# Patient Record
Sex: FEMALE | Race: BLACK OR AFRICAN AMERICAN | NOT HISPANIC OR LATINO | Employment: OTHER | URBAN - METROPOLITAN AREA
[De-identification: names, ages, dates, MRNs, and addresses within clinical notes are randomized per-mention and may not be internally consistent; named-entity substitution may affect disease eponyms.]

---

## 2018-10-03 NOTE — PATIENT DISCUSSION
New Prescription: erythromycin (erythromycin): ointment: 5 mg/gram (0.5 %) a small amount as directed into affected eye 10-

## 2018-10-03 NOTE — PATIENT DISCUSSION
PUNCTATE STAINING, OD; RX EMYCIN DEBORA QID. PT EDUCATION GIVEN. CONTINUE TO MONITOR. RETURN FOR FOLLOW UP AS SCHEDULED.

## 2022-06-18 RX ORDER — ROSUVASTATIN CALCIUM 10 MG/1
TABLET, COATED ORAL
COMMUNITY

## 2022-06-18 RX ORDER — LISINOPRIL 10 MG/1
TABLET ORAL
COMMUNITY

## 2022-06-18 RX ORDER — ALBUTEROL SULFATE 90 UG/1
AEROSOL, METERED RESPIRATORY (INHALATION)
COMMUNITY

## 2022-06-18 RX ORDER — GABAPENTIN 100 MG/1
1 CAPSULE ORAL
COMMUNITY
Start: 2019-01-25

## 2022-06-18 RX ORDER — DIAZEPAM 5 MG/1
TABLET ORAL
COMMUNITY
Start: 2021-04-06 | End: 2022-10-24

## 2022-06-18 RX ORDER — FUROSEMIDE 20 MG/1
TABLET ORAL
COMMUNITY
End: 2022-08-03 | Stop reason: SDUPTHER

## 2022-06-18 RX ORDER — ASPIRIN 81 MG/1
TABLET, CHEWABLE ORAL
COMMUNITY
End: 2022-10-24

## 2022-06-18 RX ORDER — AMLODIPINE BESYLATE 10 MG/1
TABLET ORAL
COMMUNITY

## 2022-06-18 RX ORDER — HYDROCHLOROTHIAZIDE 25 MG/1
TABLET ORAL
COMMUNITY
End: 2022-10-24

## 2022-06-18 RX ORDER — COLCHICINE 0.6 MG/1
TABLET ORAL
COMMUNITY
End: 2022-10-24

## 2022-10-24 PROBLEM — E66.9 OBESITY (BMI 30-39.9): Status: ACTIVE | Noted: 2022-10-24

## 2022-10-24 PROBLEM — M25.559 PAIN IN JOINT, PELVIC REGION AND THIGH: Status: ACTIVE | Noted: 2022-10-24

## 2022-10-24 PROBLEM — M54.31 SCIATICA OF RIGHT SIDE: Status: ACTIVE | Noted: 2022-10-24

## 2022-10-24 PROBLEM — E78.5 HYPERLIPIDEMIA: Status: ACTIVE | Noted: 2022-10-24

## 2022-10-24 PROBLEM — M54.16 LUMBAR RADICULOPATHY: Status: ACTIVE | Noted: 2022-10-24

## 2022-10-24 PROBLEM — R79.89 LOW VITAMIN D LEVEL: Status: ACTIVE | Noted: 2022-10-24

## 2022-10-24 PROBLEM — M54.50 CHRONIC LOW BACK PAIN: Status: ACTIVE | Noted: 2022-10-24

## 2022-10-24 PROBLEM — M19.049 OSTEOARTHRITIS OF HAND: Status: ACTIVE | Noted: 2022-10-24

## 2022-10-24 PROBLEM — M46.1 SACROILIITIS, NOT ELSEWHERE CLASSIFIED (HCC): Status: ACTIVE | Noted: 2022-10-24

## 2022-10-24 PROBLEM — M10.00 ACUTE IDIOPATHIC GOUT: Status: ACTIVE | Noted: 2022-10-24

## 2022-10-24 PROBLEM — M81.0 AGE-RELATED OSTEOPOROSIS WITHOUT CURRENT PATHOLOGICAL FRACTURE: Status: ACTIVE | Noted: 2022-10-24

## 2022-10-24 PROBLEM — J30.2 SEASONAL ALLERGIC RHINITIS: Status: ACTIVE | Noted: 2022-10-24

## 2022-10-24 PROBLEM — M10.9 GOUT: Status: ACTIVE | Noted: 2022-10-24

## 2022-10-24 PROBLEM — E04.1 THYROID NODULE: Status: ACTIVE | Noted: 2022-10-24

## 2022-10-24 PROBLEM — R05.9 COUGH: Status: ACTIVE | Noted: 2022-10-24

## 2022-10-24 PROBLEM — E78.2 MIXED HYPERLIPIDEMIA: Status: ACTIVE | Noted: 2022-10-24

## 2022-10-24 PROBLEM — E55.9 VITAMIN D DEFICIENCY: Status: ACTIVE | Noted: 2022-10-24

## 2022-10-24 PROBLEM — E87.6 HYPOKALEMIA: Status: ACTIVE | Noted: 2022-10-24

## 2022-10-24 PROBLEM — R01.1 HEART MURMUR: Status: ACTIVE | Noted: 2022-10-24

## 2022-10-24 PROBLEM — I10 HYPERTENSIVE DISORDER: Status: ACTIVE | Noted: 2022-10-24

## 2022-10-24 PROBLEM — R07.9 CHEST PAIN: Status: ACTIVE | Noted: 2022-10-24

## 2022-10-24 PROBLEM — E03.9 HYPOTHYROIDISM: Status: ACTIVE | Noted: 2022-10-24

## 2022-10-24 PROBLEM — I10 ESSENTIAL HYPERTENSION: Status: ACTIVE | Noted: 2022-10-24

## 2022-10-24 PROBLEM — E21.3 HYPERPARATHYROIDISM (HCC): Status: ACTIVE | Noted: 2022-10-24

## 2022-10-24 PROBLEM — H61.21 IMPACTED CERUMEN OF RIGHT EAR: Status: ACTIVE | Noted: 2022-10-24

## 2022-10-24 PROBLEM — M19.049 ARTHRITIS OF HAND: Status: ACTIVE | Noted: 2022-10-24

## 2022-10-24 PROBLEM — E83.52 HYPERCALCEMIA: Status: ACTIVE | Noted: 2022-10-24

## 2022-10-24 PROBLEM — G89.29 CHRONIC LOW BACK PAIN: Status: ACTIVE | Noted: 2022-10-24

## 2022-10-24 PROBLEM — R51.9 HEADACHE: Status: ACTIVE | Noted: 2022-10-24

## 2022-10-24 PROBLEM — I87.2 VENOUS INSUFFICIENCY: Status: ACTIVE | Noted: 2022-10-24

## 2022-10-24 PROBLEM — R42 VERTIGO: Status: ACTIVE | Noted: 2022-10-24

## 2022-10-24 PROBLEM — M10.079 ACUTE IDIOPATHIC GOUT OF FOOT: Status: ACTIVE | Noted: 2022-10-24

## 2022-10-24 PROBLEM — M48.061 SPINAL STENOSIS OF LUMBAR REGION: Status: ACTIVE | Noted: 2022-10-24

## 2022-10-24 PROBLEM — M25.569 ARTHRALGIA OF LOWER LEG: Status: ACTIVE | Noted: 2022-10-24

## 2022-12-08 ENCOUNTER — NEW PATIENT (OUTPATIENT)
Dept: URBAN - METROPOLITAN AREA CLINIC 4 | Facility: CLINIC | Age: 77
End: 2022-12-08

## 2022-12-08 PROCEDURE — 92004 COMPRE OPH EXAM NEW PT 1/>: CPT

## 2022-12-08 PROCEDURE — 92015 DETERMINE REFRACTIVE STATE: CPT

## 2022-12-08 ASSESSMENT — KERATOMETRY
OS_AXISANGLE2_DEGREES: 170
OD_K2POWER_DIOPTERS: 42.25
OD_AXISANGLE_DEGREES: 113
OS_K2POWER_DIOPTERS: 42.25
OD_AXISANGLE2_DEGREES: 23
OS_K1POWER_DIOPTERS: 41.25
OS_AXISANGLE_DEGREES: 80
OD_K1POWER_DIOPTERS: 40.75

## 2022-12-08 ASSESSMENT — VISUAL ACUITY
OD_CC: 20/30+2
OS_GLARE: 20/60-2
OU_CC: 20/40+2
OS_CC: 20/50
OD_GLARE: 20/50+1

## 2022-12-08 ASSESSMENT — TONOMETRY
OS_IOP_MMHG: 16
OD_IOP_MMHG: 15

## 2023-12-14 ENCOUNTER — ESTABLISHED PATIENT (OUTPATIENT)
Facility: LOCATION | Age: 78
End: 2023-12-14

## 2023-12-14 DIAGNOSIS — H26.491: ICD-10-CM

## 2023-12-14 DIAGNOSIS — H25.12: ICD-10-CM

## 2023-12-14 PROCEDURE — 92014 COMPRE OPH EXAM EST PT 1/>: CPT

## 2023-12-14 PROCEDURE — 92015 DETERMINE REFRACTIVE STATE: CPT

## 2023-12-14 ASSESSMENT — TONOMETRY
OS_IOP_MMHG: 14
OD_IOP_MMHG: 16

## 2023-12-14 ASSESSMENT — KERATOMETRY
OD_K2POWER_DIOPTERS: 42.50
OS_AXISANGLE2_DEGREES: 174
OD_AXISANGLE2_DEGREES: 58
OS_K1POWER_DIOPTERS: 41.50
OS_K2POWER_DIOPTERS: 42.00
OD_K1POWER_DIOPTERS: 42.25
OS_AXISANGLE_DEGREES: 84
OD_AXISANGLE_DEGREES: 148

## 2023-12-14 ASSESSMENT — VISUAL ACUITY
OS_CC: 20/50
OU_CC: 20/25
OD_CC: 20/25
OD_GLARE: 20/50
OS_GLARE: 20/80